# Patient Record
Sex: MALE | URBAN - METROPOLITAN AREA
[De-identification: names, ages, dates, MRNs, and addresses within clinical notes are randomized per-mention and may not be internally consistent; named-entity substitution may affect disease eponyms.]

---

## 2017-05-22 ENCOUNTER — OUTPATIENT (OUTPATIENT)
Dept: OUTPATIENT SERVICES | Facility: HOSPITAL | Age: 52
LOS: 1 days | Discharge: HOME | End: 2017-05-22

## 2017-05-22 ENCOUNTER — APPOINTMENT (OUTPATIENT)
Dept: OTOLARYNGOLOGY | Facility: CLINIC | Age: 52
End: 2017-05-22

## 2017-05-22 VITALS
SYSTOLIC BLOOD PRESSURE: 120 MMHG | DIASTOLIC BLOOD PRESSURE: 82 MMHG | HEART RATE: 69 BPM | WEIGHT: 171.96 LBS | HEIGHT: 68 IN | BODY MASS INDEX: 26.06 KG/M2

## 2017-05-22 DIAGNOSIS — H91.90 UNSPECIFIED HEARING LOSS, UNSPECIFIED EAR: ICD-10-CM

## 2017-05-22 DIAGNOSIS — H70.12 CHRONIC MASTOIDITIS, LEFT EAR: ICD-10-CM

## 2017-05-22 RX ORDER — NICOTINE POLACRILEX 2 MG
GUM BUCCAL
Refills: 0 | Status: ACTIVE | COMMUNITY

## 2017-05-30 ENCOUNTER — OUTPATIENT (OUTPATIENT)
Dept: OUTPATIENT SERVICES | Facility: HOSPITAL | Age: 52
LOS: 1 days | End: 2017-05-30
Payer: MEDICAID

## 2017-05-30 ENCOUNTER — APPOINTMENT (OUTPATIENT)
Dept: NEUROLOGY | Facility: HOSPITAL | Age: 52
End: 2017-05-30

## 2017-05-30 VITALS
HEART RATE: 71 BPM | WEIGHT: 171.96 LBS | SYSTOLIC BLOOD PRESSURE: 126 MMHG | BODY MASS INDEX: 26.06 KG/M2 | RESPIRATION RATE: 14 BRPM | HEIGHT: 68 IN | DIASTOLIC BLOOD PRESSURE: 80 MMHG

## 2017-05-30 DIAGNOSIS — G51.0 BELL'S PALSY: ICD-10-CM

## 2017-05-30 DIAGNOSIS — Z86.69 PERSONAL HISTORY OF OTHER DISEASES OF THE NERVOUS SYSTEM AND SENSE ORGANS: ICD-10-CM

## 2017-05-30 DIAGNOSIS — R56.9 UNSPECIFIED CONVULSIONS: ICD-10-CM

## 2017-05-30 DIAGNOSIS — H91.90 UNSPECIFIED HEARING LOSS, UNSPECIFIED EAR: ICD-10-CM

## 2017-05-30 PROCEDURE — 82164 ANGIOTENSIN I ENZYME TEST: CPT

## 2017-05-30 PROCEDURE — 36415 COLL VENOUS BLD VENIPUNCTURE: CPT

## 2017-05-30 PROCEDURE — 86618 LYME DISEASE ANTIBODY: CPT

## 2017-05-30 PROCEDURE — 86038 ANTINUCLEAR ANTIBODIES: CPT

## 2017-05-30 PROCEDURE — G0463: CPT

## 2017-05-30 PROCEDURE — 86147 CARDIOLIPIN ANTIBODY EA IG: CPT

## 2017-05-31 DIAGNOSIS — G51.0 BELL'S PALSY: ICD-10-CM

## 2017-05-31 DIAGNOSIS — H91.90 UNSPECIFIED HEARING LOSS, UNSPECIFIED EAR: ICD-10-CM

## 2017-05-31 DIAGNOSIS — H70.12 CHRONIC MASTOIDITIS, LEFT EAR: ICD-10-CM

## 2017-06-07 ENCOUNTER — APPOINTMENT (OUTPATIENT)
Dept: PLASTIC SURGERY | Facility: CLINIC | Age: 52
End: 2017-06-07

## 2017-06-21 ENCOUNTER — APPOINTMENT (OUTPATIENT)
Dept: PLASTIC SURGERY | Facility: CLINIC | Age: 52
End: 2017-06-21

## 2017-06-28 ENCOUNTER — OUTPATIENT (OUTPATIENT)
Dept: OUTPATIENT SERVICES | Facility: HOSPITAL | Age: 52
LOS: 1 days | End: 2017-06-28
Payer: MEDICAID

## 2017-06-28 VITALS
RESPIRATION RATE: 20 BRPM | WEIGHT: 171.96 LBS | TEMPERATURE: 99 F | OXYGEN SATURATION: 98 % | HEART RATE: 90 BPM | SYSTOLIC BLOOD PRESSURE: 114 MMHG | DIASTOLIC BLOOD PRESSURE: 80 MMHG | HEIGHT: 69 IN

## 2017-06-28 DIAGNOSIS — Z98.890 OTHER SPECIFIED POSTPROCEDURAL STATES: Chronic | ICD-10-CM

## 2017-06-28 DIAGNOSIS — G51.0 BELL'S PALSY: ICD-10-CM

## 2017-06-28 DIAGNOSIS — Z90.12 ACQUIRED ABSENCE OF LEFT BREAST AND NIPPLE: Chronic | ICD-10-CM

## 2017-06-28 DIAGNOSIS — H02.206 UNSPECIFIED LAGOPHTHALMOS LEFT EYE, UNSPECIFIED EYELID: ICD-10-CM

## 2017-06-28 PROCEDURE — G0463: CPT

## 2017-06-28 RX ORDER — ACETAMINOPHEN 500 MG
975 TABLET ORAL ONCE
Qty: 0 | Refills: 0 | Status: COMPLETED | OUTPATIENT
Start: 2017-06-30 | End: 2017-06-30

## 2017-06-28 RX ORDER — CELECOXIB 200 MG/1
200 CAPSULE ORAL ONCE
Qty: 0 | Refills: 0 | Status: COMPLETED | OUTPATIENT
Start: 2017-06-30 | End: 2017-06-30

## 2017-06-28 RX ORDER — SODIUM CHLORIDE 9 MG/ML
3 INJECTION INTRAMUSCULAR; INTRAVENOUS; SUBCUTANEOUS EVERY 8 HOURS
Qty: 0 | Refills: 0 | Status: DISCONTINUED | OUTPATIENT
Start: 2017-06-30 | End: 2017-07-15

## 2017-06-28 RX ORDER — LIDOCAINE HCL 20 MG/ML
0.2 VIAL (ML) INJECTION ONCE
Qty: 0 | Refills: 0 | Status: DISCONTINUED | OUTPATIENT
Start: 2017-06-30 | End: 2017-07-15

## 2017-06-28 NOTE — H&P PST ADULT - PMH
Bell's palsy  left  Mastoid disorder, left  tumor- benign Bell's palsy  left  Mastoid disorder, left  tumor- benign - 7 yrs ago

## 2017-06-28 NOTE — H&P PST ADULT - PROBLEM SELECTOR PLAN 1
Left lateral canthopexy, Placement of 1.2 gm gold weight left upper eye, left facial sling with AlloDerm graft on 6/30/17.

## 2017-06-28 NOTE — H&P PST ADULT - NSANTHOSAYNRD_GEN_A_CORE
No. BLESSING screening performed.  STOP BANG Legend: 0-2 = LOW Risk; 3-4 = INTERMEDIATE Risk; 5-8 = HIGH Risk

## 2017-06-28 NOTE — H&P PST ADULT - HISTORY OF PRESENT ILLNESS
51 yr old male with history of bells palsy with left facial droop for 5 yrs , cannot fully close left eye OR smile, - s/p canthopexy of lower eye lid 5 yrs ago , did not hold. Now coming in for Left lateral canthopexy, Placement of 1.2 gm gold weight left upper eye, left facial sling with AlloDerm graft on 6/30/17.

## 2017-06-30 ENCOUNTER — OUTPATIENT (OUTPATIENT)
Dept: OUTPATIENT SERVICES | Facility: HOSPITAL | Age: 52
LOS: 1 days | End: 2017-06-30
Payer: MEDICAID

## 2017-06-30 VITALS
SYSTOLIC BLOOD PRESSURE: 122 MMHG | HEART RATE: 68 BPM | DIASTOLIC BLOOD PRESSURE: 73 MMHG | RESPIRATION RATE: 18 BRPM | OXYGEN SATURATION: 95 % | WEIGHT: 171.96 LBS | TEMPERATURE: 98 F | HEIGHT: 69 IN

## 2017-06-30 VITALS
DIASTOLIC BLOOD PRESSURE: 82 MMHG | RESPIRATION RATE: 15 BRPM | OXYGEN SATURATION: 100 % | HEART RATE: 73 BPM | SYSTOLIC BLOOD PRESSURE: 129 MMHG

## 2017-06-30 DIAGNOSIS — Z90.12 ACQUIRED ABSENCE OF LEFT BREAST AND NIPPLE: Chronic | ICD-10-CM

## 2017-06-30 DIAGNOSIS — H02.206 UNSPECIFIED LAGOPHTHALMOS LEFT EYE, UNSPECIFIED EYELID: ICD-10-CM

## 2017-06-30 DIAGNOSIS — Z98.890 OTHER SPECIFIED POSTPROCEDURAL STATES: Chronic | ICD-10-CM

## 2017-06-30 PROCEDURE — 15840 NERVE PALSY FASCIAL GRAFT: CPT

## 2017-06-30 PROCEDURE — 15777 ACELLULAR DERM MATRIX IMPLT: CPT

## 2017-06-30 PROCEDURE — 21282 LATERAL CANTHOPEXY: CPT

## 2017-06-30 PROCEDURE — 67912 CORRECTION EYELID W/IMPLANT: CPT | Mod: LT

## 2017-06-30 PROCEDURE — 99261: CPT

## 2017-06-30 PROCEDURE — C1889: CPT

## 2017-06-30 PROCEDURE — 15277 SKN SUB GRFT F/N/HF/G CHILD: CPT

## 2017-06-30 PROCEDURE — 67912 CORRECTION EYELID W/IMPLANT: CPT | Mod: E1

## 2017-06-30 PROCEDURE — 21282 LATERAL CANTHOPEXY: CPT | Mod: LT

## 2017-06-30 RX ORDER — OXYCODONE HYDROCHLORIDE 5 MG/1
10 TABLET ORAL ONCE
Qty: 0 | Refills: 0 | Status: DISCONTINUED | OUTPATIENT
Start: 2017-06-30 | End: 2017-06-30

## 2017-06-30 RX ORDER — CELECOXIB 200 MG/1
200 CAPSULE ORAL ONCE
Qty: 0 | Refills: 0 | Status: DISCONTINUED | OUTPATIENT
Start: 2017-06-30 | End: 2017-07-15

## 2017-06-30 RX ORDER — MORPHINE SULFATE 50 MG/1
2 CAPSULE, EXTENDED RELEASE ORAL
Qty: 0 | Refills: 0 | Status: DISCONTINUED | OUTPATIENT
Start: 2017-06-30 | End: 2017-06-30

## 2017-06-30 RX ORDER — CEPHALEXIN 500 MG
1 CAPSULE ORAL
Qty: 20 | Refills: 0 | OUTPATIENT
Start: 2017-06-30 | End: 2017-07-05

## 2017-06-30 RX ORDER — ONDANSETRON 8 MG/1
4 TABLET, FILM COATED ORAL ONCE
Qty: 0 | Refills: 0 | Status: COMPLETED | OUTPATIENT
Start: 2017-06-30 | End: 2017-06-30

## 2017-06-30 RX ORDER — FAMOTIDINE 10 MG/ML
1 INJECTION INTRAVENOUS
Qty: 0 | Refills: 0 | COMMUNITY

## 2017-06-30 RX ORDER — SODIUM CHLORIDE 9 MG/ML
1000 INJECTION, SOLUTION INTRAVENOUS
Qty: 0 | Refills: 0 | Status: DISCONTINUED | OUTPATIENT
Start: 2017-06-30 | End: 2017-07-15

## 2017-06-30 RX ORDER — SODIUM CHLORIDE 9 MG/ML
1000 INJECTION INTRAMUSCULAR; INTRAVENOUS; SUBCUTANEOUS
Qty: 0 | Refills: 0 | Status: DISCONTINUED | OUTPATIENT
Start: 2017-06-30 | End: 2017-07-15

## 2017-06-30 RX ORDER — OXYCODONE HYDROCHLORIDE 5 MG/1
5 TABLET ORAL ONCE
Qty: 0 | Refills: 0 | Status: DISCONTINUED | OUTPATIENT
Start: 2017-06-30 | End: 2017-06-30

## 2017-06-30 RX ADMIN — CELECOXIB 200 MILLIGRAM(S): 200 CAPSULE ORAL at 08:18

## 2017-06-30 RX ADMIN — MORPHINE SULFATE 2 MILLIGRAM(S): 50 CAPSULE, EXTENDED RELEASE ORAL at 12:45

## 2017-06-30 RX ADMIN — OXYCODONE HYDROCHLORIDE 10 MILLIGRAM(S): 5 TABLET ORAL at 12:46

## 2017-06-30 RX ADMIN — ONDANSETRON 4 MILLIGRAM(S): 8 TABLET, FILM COATED ORAL at 12:47

## 2017-06-30 RX ADMIN — Medication 975 MILLIGRAM(S): at 08:18

## 2017-06-30 NOTE — ASU DISCHARGE PLAN (ADULT/PEDIATRIC). - MEDICATION SUMMARY - MEDICATIONS TO TAKE
I will START or STAY ON the medications listed below when I get home from the hospital:    Percocet 5/325 325 mg-5 mg oral tablet  -- 1 tab(s) by mouth every 4 hours, As Needed -for severe pain MDD:30  -- Caution federal law prohibits the transfer of this drug to any person other  than the person for whom it was prescribed.  May cause drowsiness.  Alcohol may intensify this effect.  Use care when operating dangerous machinery.  This prescription cannot be refilled.  This product contains acetaminophen.  Do not use  with any other product containing acetaminophen to prevent possible liver damage.  Using more of this medication than prescribed may cause serious breathing problems.    -- Indication: For Pain    Keflex 500 mg oral capsule  -- 1 cap(s) by mouth 4 times a day  -- Finish all this medication unless otherwise directed by prescriber.    -- Indication: For Abx    famotidine 20 mg oral tablet  -- 1 tab(s) by mouth once, 1 tab night prior to surgery, 1 tab day of surgery    -- Indication: For Home med

## 2017-06-30 NOTE — ASU DISCHARGE PLAN (ADULT/PEDIATRIC). - ITEMS TO FOLLOWUP WITH YOUR PHYSICIAN'S
Please follow up with Dr. Borja within x1 week after discharge from the hospital. You may call [phone] to schedule an appointment.

## 2017-06-30 NOTE — ASU DISCHARGE PLAN (ADULT/PEDIATRIC). - NOTIFY
Fever greater than 101/Persistent Nausea and Vomiting/Unable to Urinate/Pain not relieved by Medications

## 2017-07-05 ENCOUNTER — APPOINTMENT (OUTPATIENT)
Dept: PLASTIC SURGERY | Facility: CLINIC | Age: 52
End: 2017-07-05

## 2017-07-12 ENCOUNTER — APPOINTMENT (OUTPATIENT)
Dept: PLASTIC SURGERY | Facility: CLINIC | Age: 52
End: 2017-07-12

## 2017-07-22 ENCOUNTER — TRANSCRIPTION ENCOUNTER (OUTPATIENT)
Age: 52
End: 2017-07-22

## 2017-08-09 ENCOUNTER — APPOINTMENT (OUTPATIENT)
Dept: PLASTIC SURGERY | Facility: CLINIC | Age: 52
End: 2017-08-09
Payer: MEDICAID

## 2017-08-09 PROCEDURE — 99024 POSTOP FOLLOW-UP VISIT: CPT

## 2017-08-09 RX ORDER — MINERAL OIL, WHITE PETROLATUM .03; .94 G/G; G/G
OINTMENT OPHTHALMIC DAILY
Qty: 3.5 | Refills: 0 | Status: ACTIVE | COMMUNITY
Start: 2017-08-09 | End: 1900-01-01

## 2017-09-26 ENCOUNTER — APPOINTMENT (OUTPATIENT)
Dept: NEUROLOGY | Facility: HOSPITAL | Age: 52
End: 2017-09-26

## 2017-10-10 ENCOUNTER — APPOINTMENT (OUTPATIENT)
Dept: PLASTIC SURGERY | Facility: CLINIC | Age: 52
End: 2017-10-10
Payer: MEDICAID

## 2017-10-10 PROCEDURE — 99213 OFFICE O/P EST LOW 20 MIN: CPT

## 2017-10-10 RX ORDER — TOBRAMYCIN AND DEXAMETHASONE 3; 1 MG/ML; MG/ML
0.3-0.1 SUSPENSION/ DROPS OPHTHALMIC
Qty: 5 | Refills: 0 | Status: ACTIVE | COMMUNITY
Start: 2017-10-10 | End: 1900-01-01

## 2017-11-22 ENCOUNTER — APPOINTMENT (OUTPATIENT)
Dept: PLASTIC SURGERY | Facility: CLINIC | Age: 52
End: 2017-11-22

## 2018-05-30 ENCOUNTER — APPOINTMENT (OUTPATIENT)
Dept: PLASTIC SURGERY | Facility: CLINIC | Age: 53
End: 2018-05-30

## 2018-07-16 PROBLEM — H74.92: Chronic | Status: ACTIVE | Noted: 2017-06-28

## 2019-03-19 PROBLEM — G51.0 BELL'S PALSY: Chronic | Status: ACTIVE | Noted: 2017-06-28

## 2019-04-01 ENCOUNTER — APPOINTMENT (OUTPATIENT)
Age: 54
End: 2019-04-01
Payer: MEDICAID

## 2019-04-01 DIAGNOSIS — H02.206 UNSPECIFIED LAGOPHTHALMOS LEFT EYE, UNSPECIFIED EYELID: ICD-10-CM

## 2019-04-01 DIAGNOSIS — H04.209 UNSPECIFIED EPIPHORA, UNSPECIFIED SIDE: ICD-10-CM

## 2019-04-01 DIAGNOSIS — G51.0 BELL'S PALSY: ICD-10-CM

## 2019-04-01 PROCEDURE — 99213 OFFICE O/P EST LOW 20 MIN: CPT

## 2019-04-08 PROBLEM — G51.0 FACIAL NERVE PALSY: Status: ACTIVE | Noted: 2017-05-22

## 2019-04-08 PROBLEM — H02.206 LAGOPHTHALMOS OF LEFT EYE: Status: ACTIVE | Noted: 2017-06-07

## 2019-04-08 PROBLEM — H04.209 EYE TEARING: Status: ACTIVE | Noted: 2019-04-08

## 2019-04-08 NOTE — REVIEW OF SYSTEMS
[Negative] : Heme/Lymph [FreeTextEntry3] : +tearing left eye but less than before.  Small bump over gold weight

## 2019-04-08 NOTE — HISTORY OF PRESENT ILLNESS
[FreeTextEntry1] : Pt s/p canthopexy left eye and insertion of gold weight and left facial suspension for left facial nerve paralysis.  Pt states that he has been having a lot of tearing in his left eye.

## 2019-04-08 NOTE — PHYSICAL EXAM
[NI] : Normal [de-identified] : Left upper eye healed well gold weight in place.  Canthopexy intact suspension intact Conjunctivae injected and excess tears [de-identified] : left facial nerve paralysis

## 2019-04-08 NOTE — REASON FOR VISIT
[FreeTextEntry1] : s/p canthopexy left eye and insertion of gold weight upper eye lid. pt c/o tearing of left eye, pt reports thickness of left upper eyelid. pt is doing well.

## 2021-07-14 NOTE — PRE-ANESTHESIA EVALUATION ADULT - NSANTHPMHFT_GEN_ALL_CORE
Diagnostic wire inserted.   Wire type: In-Q. Like asthma breathing problems, breathing has been fine, 6-7 months ago had to use inhaler  6-7 years ago mastoidectomy, eye lid surgery   [place] palsy before mastoidectomy  Denies CP, SOB, GERD, cough fever
